# Patient Record
Sex: FEMALE | URBAN - METROPOLITAN AREA
[De-identification: names, ages, dates, MRNs, and addresses within clinical notes are randomized per-mention and may not be internally consistent; named-entity substitution may affect disease eponyms.]

---

## 2017-07-22 ENCOUNTER — EMERGENCY (EMERGENCY)
Facility: HOSPITAL | Age: 47
LOS: 1 days | Discharge: HOME | End: 2017-07-22
Attending: EMERGENCY MEDICINE | Admitting: EMERGENCY MEDICINE
Payer: MEDICAID

## 2017-07-22 VITALS
HEART RATE: 76 BPM | WEIGHT: 138.89 LBS | RESPIRATION RATE: 16 BRPM | OXYGEN SATURATION: 100 % | TEMPERATURE: 98 F | SYSTOLIC BLOOD PRESSURE: 120 MMHG | DIASTOLIC BLOOD PRESSURE: 78 MMHG | HEIGHT: 65 IN

## 2017-07-22 VITALS
RESPIRATION RATE: 17 BRPM | HEART RATE: 68 BPM | SYSTOLIC BLOOD PRESSURE: 110 MMHG | DIASTOLIC BLOOD PRESSURE: 69 MMHG | OXYGEN SATURATION: 100 %

## 2017-07-22 PROCEDURE — 70450 CT HEAD/BRAIN W/O DYE: CPT | Mod: 26

## 2017-07-22 PROCEDURE — 93010 ELECTROCARDIOGRAM REPORT: CPT

## 2017-07-22 PROCEDURE — 99285 EMERGENCY DEPT VISIT HI MDM: CPT | Mod: 25

## 2017-07-22 NOTE — ED ADULT NURSE NOTE - OBJECTIVE STATEMENT
Facilitator RN: Pt with no significant pmh received to [facility] ambulatory c/o brief vision change.  Pt reports sudden onset tunnel vision when taking a family picture today, Pt states "one eye was doin one thing and this eye was doin another.", change in vision followed by Pt states "fuzzy" feeling.  Pt denies alcohol intake, extraordinary physical exertion, stress or food intake and endorses good hydration.  Pt denies trauma to head, chest pain, SOB, LOC fall, and was never unresponsive per daughter at bedside.  Pt denies pain or any other symptoms.  Pt p/w NAD appears tired, breaths even unlabored, skin warm dry intact, vitals as noted.  Pt awaiting ct head with family at bedside.  Will endorse report to primary [facility] cheema.

## 2017-07-22 NOTE — ED PROVIDER NOTE - OBJECTIVE STATEMENT
45yo f w/o pmh presents s/p blurred vision episode. Pt says she had "difficulty focusing" for one hour, per family also noted her eyes looked cross eyed. Pt also describes some numbness and tingling around the right corner of her mouth. No slurred speech. Episode lasted about one hour. Pt went to [facility] who sent to ED.

## 2017-07-22 NOTE — ED PROVIDER NOTE - ATTENDING CONTRIBUTION TO CARE
I performed a face to face evaluation of this patient and performed a full history and physical examination on the patient.  I agree with the resident's history, physical examination, and plan of the patient. 45 y/o female, no pmh c/o 1 hour of feeling like her eyes were crossed and had numbness right side of lips and tongue, that resolved. States currently without neuro symptoms but feels just a "little foggy" and tired. Never had this or any neuro issues in past. vs wnl, nad, exam unremarkable including cn 2-12, vision, neuro strength/sensation. no vascular risk factors, unlikely tia, especially given distribution. unlikely MS or myasthenia, dissection or cardiac.  unclear etiology, ekg wnl, will get ct head as pt requesting and had ?neuro symptoms, encourage close neuro f/u for small chance of TIA (though low risk for future stroke). d/c. labs unlikely to be beneficial.

## 2017-07-22 NOTE — ED PROVIDER NOTE - PROGRESS NOTE DETAILS
Miguel Rouse MD PGY3: CT negative, instructed to f/u with neuro. Patient informed of ED visit findings, understands plan.  Patient instructed to follow up with their primary care physician in 2-3 days and return for new, worsened, or persistent symptoms.

## 2017-07-22 NOTE — ED ADULT TRIAGE NOTE - CHIEF COMPLAINT QUOTE
Pt arrives to ED from PMD following reported vision loss and numbness to mouth since 4PM.  Dr. Beasley called to [facility] , no stroke code called.  No slurring of speech, facial droop or weakness.

## 2017-07-24 PROBLEM — Z00.00 ENCOUNTER FOR PREVENTIVE HEALTH EXAMINATION: Status: ACTIVE | Noted: 2017-07-24

## 2017-08-11 ENCOUNTER — APPOINTMENT (OUTPATIENT)
Dept: NEUROLOGY | Facility: CLINIC | Age: 47
End: 2017-08-11

## 2018-05-22 ENCOUNTER — TRANSCRIPTION ENCOUNTER (OUTPATIENT)
Age: 48
End: 2018-05-22

## 2018-05-29 ENCOUNTER — TRANSCRIPTION ENCOUNTER (OUTPATIENT)
Age: 48
End: 2018-05-29

## 2019-02-11 ENCOUNTER — TRANSCRIPTION ENCOUNTER (OUTPATIENT)
Age: 49
End: 2019-02-11

## 2019-03-01 ENCOUNTER — OUTPATIENT (OUTPATIENT)
Dept: OUTPATIENT SERVICES | Facility: HOSPITAL | Age: 49
LOS: 1 days | End: 2019-03-01
Payer: MEDICAID

## 2019-03-01 PROCEDURE — G9001: CPT

## 2019-03-14 DIAGNOSIS — Z71.89 OTHER SPECIFIED COUNSELING: ICD-10-CM

## 2019-03-21 ENCOUNTER — TRANSCRIPTION ENCOUNTER (OUTPATIENT)
Age: 49
End: 2019-03-21

## 2022-05-17 NOTE — ED ADULT NURSE NOTE - EXTENSIONS OF SELF_ADULT
Pt did not take all her insulin this am.
Pt medicated per emar. Lights dimmed, call bell within reach. Pt w/o injury when exiting room.
None

## 2022-06-08 NOTE — ED PROVIDER NOTE - EKG #1 DATE/TIME
22-Jul-2017 22:37 Bi-Rhombic Flap Text: The defect edges were debeveled with a #15 scalpel blade.  Given the location of the defect and the proximity to free margins a bi-rhombic flap was deemed most appropriate.  Using a sterile surgical marker, an appropriate rhombic flap was drawn incorporating the defect. The area thus outlined was incised deep to adipose tissue with a #15 scalpel blade.  The skin margins were undermined to an appropriate distance in all directions utilizing iris scissors.
